# Patient Record
Sex: MALE | Employment: OTHER | URBAN - METROPOLITAN AREA
[De-identification: names, ages, dates, MRNs, and addresses within clinical notes are randomized per-mention and may not be internally consistent; named-entity substitution may affect disease eponyms.]

---

## 2017-08-10 ENCOUNTER — TRANSCRIBE ORDERS (OUTPATIENT)
Dept: ADMINISTRATIVE | Facility: HOSPITAL | Age: 77
End: 2017-08-10

## 2017-08-10 DIAGNOSIS — R31.0 GROSS HEMATURIA: Primary | ICD-10-CM

## 2017-08-16 ENCOUNTER — HOSPITAL ENCOUNTER (OUTPATIENT)
Dept: RADIOLOGY | Facility: HOSPITAL | Age: 77
Discharge: HOME | End: 2017-08-16
Attending: SPECIALIST
Payer: COMMERCIAL

## 2017-08-16 DIAGNOSIS — R31.0 GROSS HEMATURIA: ICD-10-CM

## 2017-08-16 PROCEDURE — 76770 US EXAM ABDO BACK WALL COMP: CPT

## 2017-08-29 RX ORDER — WARFARIN SODIUM 3 MG/1
TABLET ORAL
COMMUNITY

## 2017-08-29 RX ORDER — FUROSEMIDE 40 MG/1
40 TABLET ORAL DAILY
COMMUNITY

## 2017-08-29 RX ORDER — ACETAMINOPHEN 325 MG/1
650 TABLET ORAL EVERY 4 HOURS PRN
COMMUNITY

## 2017-08-29 RX ORDER — BACLOFEN 20 MG/1
10 TABLET ORAL 4 TIMES DAILY
COMMUNITY

## 2017-08-29 RX ORDER — DIPHENHYDRAMINE HCL 25 MG
25 CAPSULE ORAL EVERY 6 HOURS PRN
COMMUNITY

## 2017-08-29 RX ORDER — POTASSIUM CHLORIDE 1.5 G/1.77G
20 POWDER, FOR SOLUTION ORAL 2 TIMES DAILY
COMMUNITY

## 2017-08-29 RX ORDER — LOPERAMIDE HYDROCHLORIDE 2 MG/1
2 CAPSULE ORAL AS NEEDED
COMMUNITY

## 2017-08-29 RX ORDER — FLUOXETINE 10 MG/1
10 CAPSULE ORAL DAILY
COMMUNITY

## 2017-08-29 RX ORDER — MULTIVIT-MIN/IRON/FOLIC ACID/K 18-600-40
CAPSULE ORAL EVERY MORNING
COMMUNITY

## 2017-08-29 RX ORDER — CHOLECALCIFEROL (VITAMIN D3) 125 MCG
3000 CAPSULE ORAL
COMMUNITY

## 2017-08-29 RX ORDER — BISACODYL 10 MG
10 SUPPOSITORY, RECTAL RECTAL DAILY PRN
COMMUNITY

## 2017-08-29 RX ORDER — PREDNISOLONE ACETATE 10 MG/ML
1 SUSPENSION/ DROPS OPHTHALMIC 3 TIMES DAILY
COMMUNITY

## 2017-08-29 NOTE — PRE-PROCEDURE INSTRUCTIONS
My Surgical Experience    The following information was developed to assist you to prepare for your operation  What do I need to do before coming to the hospital?   Arrange for a responsible person to drive you to and from the hospital    Arrange care for your children at home  Children are not allowed in the recovery areas of the hospital   Plan to wear clothing that is easy to put on and take off  If you are having shoulder surgery, wear a shirt that buttons or zippers in the front  Bathing  o Shower the evening before and the morning of your surgery with an antibacterial soap  Please refer to the Pre Op Showering Instructions for Surgery Patients Sheet   o Remove nail polish and all body piercing jewelry  o Do not shave any body part for at least 24 hours before surgery-this includes face, arms, legs and upper body  Food  o Nothing to eat or drink after midnight the night before your surgery  This includes candy and chewing gum  o Exception: If your surgery is after 12:00pm (noon), you may have clear liquids such as 7-Up®, ginger ale, apple or cranberry juice, Jell-O®, water, or clear broth until 8:00 am  o Do not drink milk or juice with pulp on the morning before surgery  o Do not drink alcohol 24 hours before surgery  Medicine  o Follow instructions you received from your surgeon about which medicines you may take on the day of surgery  o If instructed to take medicine on the morning of surgery, take pills with just a small sip of water  Call your prescribing doctor for specific infroamtion on what to do if you take insulin    What should I bring to the hospital?    Bring:  Yamilet Hernandez or a walker, if you have them, for foot or knee surgery   A list of the daily medicines, vitamins, minerals, herbals and nutritional supplements you take   Include the dosages of medicines and the time you take them each day   Glasses, dentures or hearing aids   Minimal clothing; you will be wearing hospital sleepwear   Photo ID; required to verify your identity   If you have a Living Will or Power of , bring a copy of the documents   If you have an ostomy, bring an extra pouch and any supplies you use    Do not bring   Medicines or inhalers   Money, valuables or jewelry    What other information should I know about the day of surgery?  Notify your surgeons if you develop a cold, sore throat, cough, fever, rash or any other illness   Report to the Ambulatory Surgical/Same Day Surgery Unit   You will be instructed to stop at Registration only if you have not been pre-registered   Inform your  fi they do not stay that they will be asked by the staff to leave a phone number where they can be reached   Be available to be reached before surgery  In the event the operating room schedule changes, you may be asked to come in earlier or later than expected    *It is important to tell your doctor and others involved in your health care if you are taking or have been taking any non-prescription drugs, vitamins, minerals, herbals or other nutritional supplements  Any of these may interact with some food or medicines and cause a reaction      Pre-Surgery Instructions:   Medication Instructions    acetaminophen (TYLENOL) 325 mg tablet Instructed patient per Anesthesia Guidelines   baclofen 20 mg tablet Instructed patient per Anesthesia Guidelines   Besifloxacin HCl (BESIVANCE) 0 6 % SUSP Instructed patient per Anesthesia Guidelines   bisacodyl (DULCOLAX) 10 mg suppository Instructed patient per Anesthesia Guidelines   Cholecalciferol (VITAMIN D) 2000 units CAPS Instructed patient per Anesthesia Guidelines   diphenhydrAMINE (BENADRYL) 25 mg capsule Instructed patient per Anesthesia Guidelines   FLUoxetine (PROzac) 10 mg capsule Instructed patient per Anesthesia Guidelines   furosemide (LASIX) 40 mg tablet Instructed patient per Anesthesia Guidelines      lactase (LACTAID) 3,000 units tablet Instructed patient per Anesthesia Guidelines   Lactobacillus (FLORANEX PO) Instructed patient per Anesthesia Guidelines   loperamide (IMODIUM) 2 mg capsule Instructed patient per Anesthesia Guidelines   nystatin (MYCOSTATIN) 100,000 units/mL suspension Instructed patient per Anesthesia Guidelines   potassium chloride (KLOR-CON) 20 mEq packet Instructed patient per Anesthesia Guidelines   prednisoLONE acetate (PRED FORTE) 1 % ophthalmic suspension Instructed patient per Anesthesia Guidelines   Propylene Glycol (SYSTANE BALANCE OP) Instructed patient per Anesthesia Guidelines   warfarin (COUMADIN) 3 mg tablet Instructed patient per Anesthesia Guidelines  To take baclofen, fluoxetine and all eye drops a m   Of surgery

## 2017-08-31 ENCOUNTER — APPOINTMENT (OUTPATIENT)
Dept: RADIOLOGY | Facility: HOSPITAL | Age: 77
End: 2017-08-31
Payer: COMMERCIAL

## 2017-08-31 ENCOUNTER — ANESTHESIA EVENT (OUTPATIENT)
Dept: PERIOP | Facility: HOSPITAL | Age: 77
End: 2017-08-31
Payer: COMMERCIAL

## 2017-08-31 ENCOUNTER — ANESTHESIA (OUTPATIENT)
Dept: PERIOP | Facility: HOSPITAL | Age: 77
End: 2017-08-31
Payer: COMMERCIAL

## 2017-08-31 ENCOUNTER — HOSPITAL ENCOUNTER (OUTPATIENT)
Facility: HOSPITAL | Age: 77
Setting detail: OUTPATIENT SURGERY
Discharge: HOME | End: 2017-08-31
Attending: SPECIALIST | Admitting: SPECIALIST
Payer: COMMERCIAL

## 2017-08-31 VITALS
RESPIRATION RATE: 18 BRPM | WEIGHT: 158 LBS | BODY MASS INDEX: 23.4 KG/M2 | DIASTOLIC BLOOD PRESSURE: 55 MMHG | SYSTOLIC BLOOD PRESSURE: 107 MMHG | HEIGHT: 69 IN | OXYGEN SATURATION: 99 % | HEART RATE: 62 BPM | TEMPERATURE: 98 F

## 2017-08-31 DIAGNOSIS — R31.0 GROSS HEMATURIA: ICD-10-CM

## 2017-08-31 LAB
ATRIAL RATE: 56 BPM
P AXIS: 34 DEGREES
PR INTERVAL: 176 MS
QRS AXIS: 22 DEGREES
QRSD INTERVAL: 72 MS
QT INTERVAL: 436 MS
QTC INTERVAL: 420 MS
T WAVE AXIS: 63 DEGREES
VENTRICULAR RATE: 56 BPM

## 2017-08-31 PROCEDURE — 74450 X-RAY URETHRA/BLADDER: CPT

## 2017-08-31 PROCEDURE — 93005 ELECTROCARDIOGRAM TRACING: CPT | Performed by: SPECIALIST

## 2017-08-31 PROCEDURE — 88305 TISSUE EXAM BY PATHOLOGIST: CPT | Performed by: SPECIALIST

## 2017-08-31 RX ORDER — FENTANYL CITRATE/PF 50 MCG/ML
25 SYRINGE (ML) INJECTION
Status: DISCONTINUED | OUTPATIENT
Start: 2017-08-31 | End: 2017-08-31 | Stop reason: HOSPADM

## 2017-08-31 RX ORDER — HYDROMORPHONE HCL 110MG/55ML
0.5 PATIENT CONTROLLED ANALGESIA SYRINGE INTRAVENOUS
Status: DISCONTINUED | OUTPATIENT
Start: 2017-08-31 | End: 2017-08-31 | Stop reason: HOSPADM

## 2017-08-31 RX ORDER — SODIUM CHLORIDE, SODIUM LACTATE, POTASSIUM CHLORIDE, CALCIUM CHLORIDE 600; 310; 30; 20 MG/100ML; MG/100ML; MG/100ML; MG/100ML
INJECTION, SOLUTION INTRAVENOUS CONTINUOUS PRN
Status: DISCONTINUED | OUTPATIENT
Start: 2017-08-31 | End: 2017-08-31 | Stop reason: SURG

## 2017-08-31 RX ORDER — PROPOFOL 10 MG/ML
INJECTION, EMULSION INTRAVENOUS AS NEEDED
Status: DISCONTINUED | OUTPATIENT
Start: 2017-08-31 | End: 2017-08-31 | Stop reason: SURG

## 2017-08-31 RX ORDER — MAGNESIUM HYDROXIDE 1200 MG/15ML
LIQUID ORAL AS NEEDED
Status: DISCONTINUED | OUTPATIENT
Start: 2017-08-31 | End: 2017-08-31 | Stop reason: HOSPADM

## 2017-08-31 RX ORDER — SODIUM CHLORIDE, SODIUM LACTATE, POTASSIUM CHLORIDE, CALCIUM CHLORIDE 600; 310; 30; 20 MG/100ML; MG/100ML; MG/100ML; MG/100ML
20 INJECTION, SOLUTION INTRAVENOUS CONTINUOUS
Status: DISCONTINUED | OUTPATIENT
Start: 2017-08-31 | End: 2017-08-31 | Stop reason: HOSPADM

## 2017-08-31 RX ORDER — LIDOCAINE HYDROCHLORIDE 10 MG/ML
INJECTION, SOLUTION INFILTRATION; PERINEURAL AS NEEDED
Status: DISCONTINUED | OUTPATIENT
Start: 2017-08-31 | End: 2017-08-31 | Stop reason: SURG

## 2017-08-31 RX ORDER — FENTANYL CITRATE 50 UG/ML
INJECTION, SOLUTION INTRAMUSCULAR; INTRAVENOUS AS NEEDED
Status: DISCONTINUED | OUTPATIENT
Start: 2017-08-31 | End: 2017-08-31 | Stop reason: SURG

## 2017-08-31 RX ADMIN — PROPOFOL 20 MG: 10 INJECTION, EMULSION INTRAVENOUS at 11:03

## 2017-08-31 RX ADMIN — FENTANYL CITRATE 25 MCG: 50 INJECTION, SOLUTION INTRAMUSCULAR; INTRAVENOUS at 11:05

## 2017-08-31 RX ADMIN — FENTANYL CITRATE 50 MCG: 50 INJECTION, SOLUTION INTRAMUSCULAR; INTRAVENOUS at 11:12

## 2017-08-31 RX ADMIN — SODIUM CHLORIDE, SODIUM LACTATE, POTASSIUM CHLORIDE, AND CALCIUM CHLORIDE: .6; .31; .03; .02 INJECTION, SOLUTION INTRAVENOUS at 10:30

## 2017-08-31 RX ADMIN — PROPOFOL 30 MG: 10 INJECTION, EMULSION INTRAVENOUS at 11:05

## 2017-08-31 RX ADMIN — PROPOFOL 40 MG: 10 INJECTION, EMULSION INTRAVENOUS at 10:58

## 2017-08-31 RX ADMIN — CEFAZOLIN SODIUM 2000 MG: 2 SOLUTION INTRAVENOUS at 10:56

## 2017-08-31 RX ADMIN — LIDOCAINE HYDROCHLORIDE 30 MG: 10 INJECTION, SOLUTION INFILTRATION; PERINEURAL at 10:58

## 2017-08-31 RX ADMIN — FENTANYL CITRATE 25 MCG: 50 INJECTION, SOLUTION INTRAMUSCULAR; INTRAVENOUS at 10:58

## 2017-08-31 NOTE — H&P
H&P Exam - Urology       Patient: Jesse Cortez   :  Sex: male   MRN: [id number]     CSN: [id number]      History of Present Illness   HPI:  Jesse Cortez is a 65 y o  male who presents with chronic suprapubic tube with episodic gross painless hematuria urine culture negative patient undergo evaluation for possible bladder malignancy cysto retrograde S0 possible biopsy changes P2        Review of Systems:   Constitutional:  Negative for activity change, fever, chills and diaphoresis  HENT: Negative for hearing loss and trouble swallowing  Eyes: Negative for itching and visual disturbance  Respiratory: Negative for chest tightness and shortness of breath  Cardiovascular: Negative for chest pain, edema  Gastrointestinal: Negative for abdominal distention, na abdominal pain, constipation, diarrhea, Nausea and vomiting  Genitourinary: Negative for decreased urine volume, difficulty urinating, dysuria, enuresis, frequency, hematuria and urgency  Musculoskeletal: Negative for gait problem and myalgias  Neurological: Negative for dizziness and headaches  Hematological: Does not bruise/bleed easily  Historical Information   Past Medical History:   Diagnosis Date    Bowel trouble     inflammatory bowel disease    BPH (benign prostatic hyperplasia)     with urinary retention, insertion of a supra pubic tube     Bullous disorder     Confusion     " slight "    Contracture, joint     of right hand and left arm    Depression     DVT, lower extremity     h/o on coumadin    Edema     leg    Encephalopathy     Hypocalcemia     Irritable bowel syndrome     Multiple sclerosis     wheelchair only- unable to bear weight      Neurogenic bladder     h/o urosepsis     Past Surgical History:   Procedure Laterality Date    BACK SURGERY      LUMBAR LAMINECTOMY      PROSTATE BIOPSY      SUPRAPUBIC TUBE PLACEMENT  2015    VENA CAVA FILTER PLACEMENT      2013     Social History History   Alcohol Use No     History   Drug Use No     History   Smoking Status    Never Smoker   Smokeless Tobacco    Not on file     Family History: History reviewed  No pertinent family history  Meds/Allergies   Prescriptions Prior to Admission   Medication    baclofen 20 mg tablet    Besifloxacin HCl (BESIVANCE) 0 6 % SUSP    Cholecalciferol (VITAMIN D) 2000 units CAPS    FLUoxetine (PROzac) 10 mg capsule    furosemide (LASIX) 40 mg tablet    lactase (LACTAID) 3,000 units tablet    Lactobacillus (FLORANEX PO)    nystatin (MYCOSTATIN) 100,000 units/mL suspension    potassium chloride (KLOR-CON) 20 mEq packet    prednisoLONE acetate (PRED FORTE) 1 % ophthalmic suspension    Propylene Glycol (SYSTANE BALANCE OP)    warfarin (COUMADIN) 3 mg tablet    acetaminophen (TYLENOL) 325 mg tablet    bisacodyl (DULCOLAX) 10 mg suppository    diphenhydrAMINE (BENADRYL) 25 mg capsule    loperamide (IMODIUM) 2 mg capsule     Allergies   Allergen Reactions    Lactose      intolerant    Sulfa Antibiotics        Objective   Vitals: /56   Pulse 57   Temp (!) 97 3 °F (36 3 °C) (Tympanic)   Resp 18   Ht 5' 9" (1 753 m)   Wt 71 7 kg (158 lb)   SpO2 99%   BMI 23 33 kg/m²     Physical Exam:  General Alert awake   Normocephalic atraumatic PERRLA  Lungs clear bilaterally  Cardiac normal S1 normal S2  Abdomen soft, flank pain  Extremities no edema    No intake/output data recorded  Invasive Devices     Peripheral Intravenous Line            Peripheral IV 08/31/17 Right Hand less than 1 day                    Lab Results: CBC: No results found for: WBC, HGB, HCT, MCV, PLT, ADJUSTEDWBC, MCH, MCHC, RDW, MPV, NRBC  CMP: No results found for: NA, CL, CO2, ANIONGAP, BUN, CREATININE, GLUCOSE, CALCIUM, AST, ALT, ALKPHOS, PROT, ALBUMIN, BILITOT, EGFR  Urinalysis: No results found for: Marilyn Curlin, SPECGRAV, PHUR, LEUKOCYTESUR, NITRITE, PROTEINUA, GLUCOSEU, KETONESU, BILIRUBINUR, BLOODU  Urine Culture:  No results found for: Nile Brooks  PSA: No results found for: PSA        Assessment/ Plan:  Gross hematuria gross hematuria  Suprapubic tube  Cysto bilateral retrogrades change SP tube possible biopsy      Samm Leger MD

## 2017-08-31 NOTE — DISCHARGE INSTRUCTIONS
#1 no heavy straining or lifting above 10 pounds for 2 weeks    #2 call office fevers, chills, or worsening blood in the urine  #3 Follow-up 4 weeks    Pete HASSAN  [address] [place] [address] office  [address] [place] [address] [address]  [phone]  8:30 AM to 4:30 PM  Monday through Friday    [facility] [place] office  [phone]  [address] [facility] [place], [address] [place] [address]  [phone]  1:00 to 5:00 PM  Wednesday

## 2017-08-31 NOTE — PERIOPERATIVE NURSING NOTE
Pt received from [facility] via stretcher  Awake and comfortable  Supra pubic tube draining clear aakash urine  Pt denies any discomfort  Tolerating PO fluids without difficulty  Report called to [facility]

## 2017-08-31 NOTE — OP NOTE
OPERATIVE REPORT  PATIENT NAME: Aleks Hameed    :  [id number]  MRN: [id number]  Pt Location: [facility] OR ROOM 04    SURGERY DATE: 2017    Surgeon(s) and Role:     * Rakesh Fu MD - Primary    Preop Diagnosis:  Gross hematuria [R31 0]    Post-Op Diagnosis Codes:     * Gross hematuria [R31 0]    Procedure cysto bilateral fluoroscopic retrograde pyelograms bladder biopsy fulguration of bleeding prostatic vessels changes suprapubic tube    Specimen(s):  ID Type Source Tests Collected by Time Destination   1 : POSTERIOR BLADDER WALL Tissue Urinary Bladder TISSUE EXAM Rakesh Fu MD 2017 [id number]        Estimated Blood Loss:   0 mL    Drains:       Anesthesia Type:   Choice    Operative Indications:  Gross hematuria [R31 0]  This 65-year-old male with seen in the office for evaluation of painless gross hematuria patient has a chronic SP tube placed years ago patient is on chronic Coumadin in light of chronic SP tube and chance of bladder malignancy patient now to undergo evaluation in the OR with cysto bilateral retrograde pyelograms possible biopsy and SP tube change    Operative Findings:  1  Is severely inflamed bladder from SP tube with bolus edema and inflammatory changes biopsy x1 taken  2   4 0 hypervascular prostatic urethra with immediate bleeding noted as cystoscopy performed aggressive fulguration of prostatic urethra  3  Bilateral fluoroscopic retrograde with normal limits  4    2 5 cm bladder stone noted not addressed in light of bleeding issues if patient continues to bleed at nursing home will need to schedule cysto with the pexy off Coumadin for 5 days and aspirin for 7 days    Complications:   None    Procedure and Technique:  After the patient identified in the holding area consent verbally agreed and witnessed he was brought into the operative suite after anesthesia was induced he was placed in the modified lithotomy position draped and prepped in sterile fashion time-out performed 22 [place] cystoscope was passed through the urethra anterior urethra confirm no abnormalities posterior urethra was 4 0 cm with severe inflammatory changes and hypervascularity with immediate bleeding noted as scope was passed into the bladder lumen there was severe inflammatory changes throughout the bladder around the SP tube and lateral walls there was a 2 5 cm bladder stone noted to be contained within the inflammatory tissue a 5 [facility] open-ended catheter was placed into the left orifice and left fluoroscopic retrograde performed this was followed by right normal filling and drainage biopsy forceps placed biopsy of the posterior lateral bladder lesion was performed attempts at pulling the bladder stone out of the inflammation resulted in hematuria  The blood be was placed and aggressive fulguration of this area as well as the prostatic urethra and previous biopsy site was performed at this point in light of the patient being off Coumadin for just a few days as well as aspirin attempts at cysto with ataxia were aborted the scope was removed the indwelling 24 [facility] SP tube was removed and a new 24 [facility] was placed the patient was awakened taken to recovery room stable condition     I was present for the entire procedure    Patient Disposition:  PACU     SIGNATURE: Cayden Gutiérrez MD  DATE: August 31, 2017  TIME: 11:32 AM

## (undated) DEVICE — GROUNDING PAD UNIVERSAL SLW

## (undated) DEVICE — SYRINGE 10ML LL CONTROL TOP

## (undated) DEVICE — CYSTOSCOPY PACK: Brand: CONVERTORS

## (undated) DEVICE — GLOVE SRG BIOGEL 8

## (undated) DEVICE — URETERAL CATH 5 FR

## (undated) DEVICE — POUCH UR CATCHER STERILE

## (undated) DEVICE — FABRIC REINFORCED, SURGICAL GOWN, XL: Brand: CONVERTORS

## (undated) DEVICE — CYSTO TUBING TUR Y IRRIGATION

## (undated) DEVICE — RADIOLOGY STERILE LABELS: Brand: CENTURION

## (undated) DEVICE — LUBRICANT SURGILUBE TUBE 4 OZ  FLIP TOP